# Patient Record
Sex: FEMALE | Race: WHITE | NOT HISPANIC OR LATINO | Employment: FULL TIME | URBAN - METROPOLITAN AREA
[De-identification: names, ages, dates, MRNs, and addresses within clinical notes are randomized per-mention and may not be internally consistent; named-entity substitution may affect disease eponyms.]

---

## 2023-04-21 ENCOUNTER — HOSPITAL ENCOUNTER (EMERGENCY)
Facility: HOSPITAL | Age: 28
Discharge: ANOTHER HOSPITAL | End: 2023-04-21
Attending: EMERGENCY MEDICINE
Payer: MEDICAID

## 2023-04-21 VITALS
WEIGHT: 195.19 LBS | HEIGHT: 66 IN | BODY MASS INDEX: 31.37 KG/M2 | DIASTOLIC BLOOD PRESSURE: 80 MMHG | SYSTOLIC BLOOD PRESSURE: 126 MMHG | OXYGEN SATURATION: 96 % | HEART RATE: 77 BPM | RESPIRATION RATE: 18 BRPM | TEMPERATURE: 98 F

## 2023-04-21 DIAGNOSIS — L03.211 FACIAL CELLULITIS: Primary | ICD-10-CM

## 2023-04-21 DIAGNOSIS — R55 SYNCOPE: ICD-10-CM

## 2023-04-21 LAB
ALBUMIN SERPL BCP-MCNC: 4 G/DL (ref 3.5–5.2)
ALP SERPL-CCNC: 79 U/L (ref 55–135)
ALT SERPL W/O P-5'-P-CCNC: 13 U/L (ref 10–44)
ANION GAP SERPL CALC-SCNC: 15 MMOL/L (ref 8–16)
AST SERPL-CCNC: 22 U/L (ref 10–40)
B-HCG UR QL: NEGATIVE
BASOPHILS # BLD AUTO: 0.06 K/UL (ref 0–0.2)
BASOPHILS NFR BLD: 0.6 % (ref 0–1.9)
BILIRUB SERPL-MCNC: 0.5 MG/DL (ref 0.1–1)
BUN SERPL-MCNC: 11 MG/DL (ref 6–20)
CALCIUM SERPL-MCNC: 9.4 MG/DL (ref 8.7–10.5)
CHLORIDE SERPL-SCNC: 104 MMOL/L (ref 95–110)
CO2 SERPL-SCNC: 18 MMOL/L (ref 23–29)
CREAT SERPL-MCNC: 0.8 MG/DL (ref 0.5–1.4)
DIFFERENTIAL METHOD: ABNORMAL
EOSINOPHIL # BLD AUTO: 0.2 K/UL (ref 0–0.5)
EOSINOPHIL NFR BLD: 1.8 % (ref 0–8)
ERYTHROCYTE [DISTWIDTH] IN BLOOD BY AUTOMATED COUNT: 12.9 % (ref 11.5–14.5)
EST. GFR  (NO RACE VARIABLE): >60 ML/MIN/1.73 M^2
GLUCOSE SERPL-MCNC: 94 MG/DL (ref 70–110)
HCT VFR BLD AUTO: 40.5 % (ref 37–48.5)
HGB BLD-MCNC: 13.4 G/DL (ref 12–16)
IMM GRANULOCYTES # BLD AUTO: 0.04 K/UL (ref 0–0.04)
IMM GRANULOCYTES NFR BLD AUTO: 0.4 % (ref 0–0.5)
LYMPHOCYTES # BLD AUTO: 1.2 K/UL (ref 1–4.8)
LYMPHOCYTES NFR BLD: 12.5 % (ref 18–48)
MCH RBC QN AUTO: 29.5 PG (ref 27–31)
MCHC RBC AUTO-ENTMCNC: 33.1 G/DL (ref 32–36)
MCV RBC AUTO: 89 FL (ref 82–98)
MONOCYTES # BLD AUTO: 0.5 K/UL (ref 0.3–1)
MONOCYTES NFR BLD: 4.8 % (ref 4–15)
NEUTROPHILS # BLD AUTO: 7.9 K/UL (ref 1.8–7.7)
NEUTROPHILS NFR BLD: 79.9 % (ref 38–73)
NRBC BLD-RTO: 0 /100 WBC
PLATELET # BLD AUTO: 316 K/UL (ref 150–450)
PMV BLD AUTO: 10.9 FL (ref 9.2–12.9)
POTASSIUM SERPL-SCNC: 4.3 MMOL/L (ref 3.5–5.1)
PROT SERPL-MCNC: 7.9 G/DL (ref 6–8.4)
RBC # BLD AUTO: 4.54 M/UL (ref 4–5.4)
SODIUM SERPL-SCNC: 137 MMOL/L (ref 136–145)
WBC # BLD AUTO: 9.93 K/UL (ref 3.9–12.7)

## 2023-04-21 PROCEDURE — 90715 TDAP VACCINE 7 YRS/> IM: CPT | Mod: ER | Performed by: EMERGENCY MEDICINE

## 2023-04-21 PROCEDURE — 93010 EKG 12-LEAD: ICD-10-PCS | Mod: ,,, | Performed by: INTERNAL MEDICINE

## 2023-04-21 PROCEDURE — 96375 TX/PRO/DX INJ NEW DRUG ADDON: CPT | Mod: ER

## 2023-04-21 PROCEDURE — 85025 COMPLETE CBC W/AUTO DIFF WBC: CPT | Mod: ER | Performed by: EMERGENCY MEDICINE

## 2023-04-21 PROCEDURE — 96365 THER/PROPH/DIAG IV INF INIT: CPT | Mod: 59,ER

## 2023-04-21 PROCEDURE — 93010 ELECTROCARDIOGRAM REPORT: CPT | Mod: ,,, | Performed by: INTERNAL MEDICINE

## 2023-04-21 PROCEDURE — 93005 ELECTROCARDIOGRAM TRACING: CPT | Mod: ER

## 2023-04-21 PROCEDURE — 90471 IMMUNIZATION ADMIN: CPT | Mod: ER | Performed by: EMERGENCY MEDICINE

## 2023-04-21 PROCEDURE — 25500020 PHARM REV CODE 255: Mod: ER | Performed by: EMERGENCY MEDICINE

## 2023-04-21 PROCEDURE — 99285 EMERGENCY DEPT VISIT HI MDM: CPT | Mod: 25,ER

## 2023-04-21 PROCEDURE — 80053 COMPREHEN METABOLIC PANEL: CPT | Mod: ER | Performed by: EMERGENCY MEDICINE

## 2023-04-21 PROCEDURE — 25000003 PHARM REV CODE 250: Mod: ER | Performed by: EMERGENCY MEDICINE

## 2023-04-21 PROCEDURE — 81025 URINE PREGNANCY TEST: CPT | Mod: ER | Performed by: EMERGENCY MEDICINE

## 2023-04-21 PROCEDURE — 63600175 PHARM REV CODE 636 W HCPCS: Mod: ER | Performed by: EMERGENCY MEDICINE

## 2023-04-21 RX ORDER — KETOROLAC TROMETHAMINE 30 MG/ML
30 INJECTION, SOLUTION INTRAMUSCULAR; INTRAVENOUS EVERY 6 HOURS PRN
Status: DISCONTINUED | OUTPATIENT
Start: 2023-04-21 | End: 2023-04-21 | Stop reason: HOSPADM

## 2023-04-21 RX ORDER — KETOROLAC TROMETHAMINE 30 MG/ML
30 INJECTION, SOLUTION INTRAMUSCULAR; INTRAVENOUS
Status: COMPLETED | OUTPATIENT
Start: 2023-04-21 | End: 2023-04-21

## 2023-04-21 RX ORDER — NAPROXEN 500 MG/1
500 TABLET ORAL 2 TIMES DAILY
COMMUNITY

## 2023-04-21 RX ORDER — SULFAMETHOXAZOLE AND TRIMETHOPRIM 800; 160 MG/1; MG/1
1 TABLET ORAL
COMMUNITY

## 2023-04-21 RX ADMIN — IOHEXOL 75 ML: 350 INJECTION, SOLUTION INTRAVENOUS at 09:04

## 2023-04-21 RX ADMIN — KETOROLAC TROMETHAMINE 30 MG: 30 INJECTION, SOLUTION INTRAMUSCULAR; INTRAVENOUS at 10:04

## 2023-04-21 RX ADMIN — CEFTRIAXONE 1 G: 1 INJECTION, POWDER, FOR SOLUTION INTRAMUSCULAR; INTRAVENOUS at 10:04

## 2023-04-21 RX ADMIN — TETANUS TOXOID, REDUCED DIPHTHERIA TOXOID AND ACELLULAR PERTUSSIS VACCINE, ADSORBED 0.5 ML: 5; 2.5; 8; 8; 2.5 SUSPENSION INTRAMUSCULAR at 08:04

## 2023-04-21 NOTE — ED PROVIDER NOTES
Emergency Medicine Provider Note - 4/21/2023        History     Chief Complaint   Patient presents with    Facial Swelling     Fell into glass table Wednesday, small lac to left cheek. Seen at urgent care, given antibiotics          History of Present Illness   HPI    4/21/2023, 8:10 AM  The history is provided by the patient    Yuval Pltaa is a 28 y.o. female presenting to the ED for evaluation of swelling to the left cheek.  Patient reports that she had blacked out either laid on the 18th or early morning on the 19th.  She reports that she woke up with her glass table broken and bleeding.  Patient was seen at a outlying urgent care on April 19, 2023.  She is prescribed naproxen as well as Bactrim.  Patient reports that she has to wear a mask at work.  She reports that she started having swelling on Wednesday, worse yesterday and more today.  Hurts when she opens her mouth.  Patient does admit to drinking alcohol that night-but she did not get drunk.  Patient denies any neck pain, paresthesias of the hands, chest pain, chest pressure, shortness of breath, fever, difficulty breathing, abdominal pain.  Unknown last tetanus.      Arrival mode:  Personal Vehicle      PCP: Primary Doctor No     Allergies:  Review of patient's allergies indicates:   Allergen Reactions    Clindamycin Hives       Past Medical History:  History reviewed. No pertinent past medical history.    Past Surgical History:  History reviewed. No pertinent surgical history.      Family History:  History reviewed. No pertinent family history.    Social History:  Social History     Tobacco Use    Smoking status: Never    Smokeless tobacco: Never   Substance and Sexual Activity    Alcohol use: Yes    Drug use: Never    Sexual activity: Not on file       Triage note, Allergies, Past Medical History, Past Surgical History, Family History and Social History reviewed as documented above.     Review of Systems   Review of Systems   Constitutional:   Negative for fever.   HENT:  Negative for sore throat.         (+) Facial swelling     Respiratory:  Negative for shortness of breath.    Cardiovascular:  Negative for chest pain.   Gastrointestinal:  Negative for nausea.   Genitourinary:  Negative for dysuria.   Musculoskeletal:  Negative for back pain, neck pain and neck stiffness.   Skin:  Negative for rash.   Neurological:  Negative for weakness and numbness.   Hematological:  Does not bruise/bleed easily.        Physical Exam     Initial Vitals [04/21/23 0805]   BP Pulse Resp Temp SpO2   129/89 89 20 98 °F (36.7 °C) 97 %      MAP       --          Physical Exam    Nursing Notes and Vital Signs Reviewed.  Constitutional: Patient is in. Well-developed and well-nourished.  Head:  Normocephalic.  There is swelling noted to the left side of the face.  There is a eschar present.  No warmth or erythema noted.  Eyes: PERRL. EOM intact. Conjunctivae are not pale. No scleral icterus.  ENT: Mucous membranes are moist. Oropharynx is clear and symmetric.  No hemotympanum.  Patient is able to handle secretions.  There is trismus present.  No deformity to the inside of the mouth.  No sub lingual hematoma.  Neck: Supple. Full ROM. No lymphadenopathy.  No midline cervical neck tenderness.  Patient able to rotate neck 45° without pain.  Cardiovascular: Regular rate. Regular rhythm. No murmurs, rubs, or gallops. Distal pulses are 2+ and symmetric.  Pulmonary/Chest: No respiratory distress. Clear to auscultation bilaterally. No wheezing or rales.  Abdominal: Soft and non-distended.  There is no tenderness.  No rebound, guarding, or rigidity. Good bowel sounds.  Musculoskeletal: Moves all extremities. No obvious deformities. No edema. No calf tenderness.  Skin: Warm and dry.  Neurological:  Alert, awake, and appropriate.  Normal speech.  No acute focal neurological deficits are appreciated.  GCS 15.  Psychiatric: Normal affect. Good eye contact. Appropriate in content.           ED  "Course     ED Procedures:  Procedures    ED Vital Signs:  Vitals:    04/21/23 0805 04/21/23 1032 04/21/23 1238 04/21/23 1356   BP: 129/89 120/77 112/70    Pulse: 89 78 88    Resp: 20  19    Temp: 98 °F (36.7 °C)      TempSrc: Oral      SpO2: 97% 96% 99%    Weight: 88.6 kg (195 lb 3.5 oz)      Height:    5' 6" (1.676 m)       Abnormal Lab Results:  Labs Reviewed   CBC W/ AUTO DIFFERENTIAL - Abnormal; Notable for the following components:       Result Value    Gran # (ANC) 7.9 (*)     Gran % 79.9 (*)     Lymph % 12.5 (*)     All other components within normal limits   COMPREHENSIVE METABOLIC PANEL - Abnormal; Notable for the following components:    CO2 18 (*)     All other components within normal limits   PREGNANCY TEST, URINE RAPID    Narrative:     Specimen Source->Urine        All Lab Results:  Results for orders placed or performed during the hospital encounter of 04/21/23   Rapid Pregnancy, Urine   Result Value Ref Range    Preg Test, Ur Negative    CBC Auto Differential   Result Value Ref Range    WBC 9.93 3.90 - 12.70 K/uL    RBC 4.54 4.00 - 5.40 M/uL    Hemoglobin 13.4 12.0 - 16.0 g/dL    Hematocrit 40.5 37.0 - 48.5 %    MCV 89 82 - 98 fL    MCH 29.5 27.0 - 31.0 pg    MCHC 33.1 32.0 - 36.0 g/dL    RDW 12.9 11.5 - 14.5 %    Platelets 316 150 - 450 K/uL    MPV 10.9 9.2 - 12.9 fL    Immature Granulocytes 0.4 0.0 - 0.5 %    Gran # (ANC) 7.9 (H) 1.8 - 7.7 K/uL    Immature Grans (Abs) 0.04 0.00 - 0.04 K/uL    Lymph # 1.2 1.0 - 4.8 K/uL    Mono # 0.5 0.3 - 1.0 K/uL    Eos # 0.2 0.0 - 0.5 K/uL    Baso # 0.06 0.00 - 0.20 K/uL    nRBC 0 0 /100 WBC    Gran % 79.9 (H) 38.0 - 73.0 %    Lymph % 12.5 (L) 18.0 - 48.0 %    Mono % 4.8 4.0 - 15.0 %    Eosinophil % 1.8 0.0 - 8.0 %    Basophil % 0.6 0.0 - 1.9 %    Differential Method Automated    Comprehensive Metabolic Panel   Result Value Ref Range    Sodium 137 136 - 145 mmol/L    Potassium 4.3 3.5 - 5.1 mmol/L    Chloride 104 95 - 110 mmol/L    CO2 18 (L) 23 - 29 mmol/L    " Glucose 94 70 - 110 mg/dL    BUN 11 6 - 20 mg/dL    Creatinine 0.8 0.5 - 1.4 mg/dL    Calcium 9.4 8.7 - 10.5 mg/dL    Total Protein 7.9 6.0 - 8.4 g/dL    Albumin 4.0 3.5 - 5.2 g/dL    Total Bilirubin 0.5 0.1 - 1.0 mg/dL    Alkaline Phosphatase 79 55 - 135 U/L    AST 22 10 - 40 U/L    ALT 13 10 - 44 U/L    Anion Gap 15 8 - 16 mmol/L    eGFR >60.0 >60 mL/min/1.73 m^2         ECG Results              EKG 12-lead (Preliminary result)  Result time 04/21/23 08:54:26      Wet Read by Missy Xiong DO (04/21/23 08:54:26, [facility], Emergency Medicine)    Rate of 80 beats per minute.  Sinus rhythm.  Sinus arrhythmia.  Normal axis.  No ST segment elevation.  No STEMI.                                    Imaging Results:  Imaging Results              CT Maxillofacial With Contrast (Final result)  Result time 04/21/23 09:51:07      Final result by ANNELISE De Paz Sr., MD (04/21/23 09:51:07)                   Impression:      1. There is a mild amount of haziness on the left side of the face.  This is consistent with the patient's history and characteristic of a cellulitis.  There is no abscess or abnormal drainable fluid collection visualized.  2. There are prominent size lymph nodes on both sides of the neck.  One of the larger ones is located in the left anterior lymph node chain at the level of C3.  It has a short axis measurement of 10 mm.  3. The paranasal sinuses are clear.  All CT scans at this facility use dose modulation, iterative reconstruction, and/or weight base dosing when appropriate to reduce radiation dose when appropriate to reduce radiation dose to as low as reasonably achievable.      Electronically signed by: Timothy De Paz MD  Date:    04/21/2023  Time:    09:51               Narrative:    EXAMINATION:  CT MAXILLOFACIAL WITH CONTRAST    CLINICAL HISTORY:  Maxillary/facial abscess;    TECHNIQUE:  Standard facial CT protocol with IV contrast material was performed.  75 mL of Omnipaque  350 contrast material was used for this examination.    COMPARISON:  None    FINDINGS:  There is a mild amount of haziness on the left side of the face.  There is no abscess or abnormal drainable fluid collection visualized.  There are prominent size lymph nodes on both sides of the neck.  One of the larger ones is located in the left anterior lymph node chain at the level of C3.  It has a short axis measurement of 10 mm.  The ostiomeatal complexes are patent bilaterally. The paranasal sinuses are clear. There is no fracture. There is no dislocation.                                            The Emergency Provider reviewed the vital signs and test results, which are outlined above.     ED Discussion     ED Course as of 04/21/23 1435   Fri Apr 21, 2023   0853 WBC: 9.93 [LB]   0853 Hemoglobin: 13.4 [LB]   0853 Hematocrit: 40.5 [LB]   1017 Patient had been on Bactrim for 48 hours.  Patient reports increasing swelling.  Concern for outpatient cellulitis.  Discussed with patient that there was no identified bowel drainable abscess.  Patient does have trismus.  Recommend hospitalization.  Patient is requesting transfer to [facility] or [facility] provided they have oral maxillofacial services.  Of note [facility] does not have oral maxillofacial services, ENT Services, or dental services today.  As patient is experiencing trismus, I do recommend facility with oral maxillofacial in case patient's situation progressive.  Patient is aware that we recommend transfer via ambulance.  She is requesting to go via private vehicle. [LB]   1039 [facility] does not have oral maxillofacial.  You [facility] available as well as possible [place].  This was discussed with patient.  Patient is deciding whether to proceed with transfer or sign out against medical advice [LB]   1337 Patient has been updated on no local facilities with oral maxillofacial.  Although patient  does not have anything that specifically needs to drain, there is no local facility.  Patient is on waiting list at [facility].  Patient has been offered food.  Awaiting acceptance. [LB]   1340 Of note, patient was given IV ketorolac.  Patient is aware that we transfer via ambulance.  However she is requesting to go by private vehicle.  Therefore narcotic medication not administered. [LB]   1349 [facility] still on divert. [LB]   1414 Spoke with Dr. Elsa Arrillaga at [facility] ED. [LB]   1420 Updated patient on [LB]   1424 Discussed with patient risk of private vehicle transfer include MVC, death, inclement weather, loss of airway, swelling. [LB]      ED Course User Index  [LB] Missy Xiong DO     2:25 PM   All historical, clinical, radiographic, and laboratory findings were reviewed with the patient/family in detail.  I discussed the indications and treatment need: ( Oral/maxofacial ) .    Patient/Family requests transfer to:  [facility]    Patient/Family understands that [facility] does NOT provide ( Oral/maxofacial ) services.     Patient/family verbalized understanding.   All remaining questions and concerns were addressed at that time and the patient/family agrees to proceed accordingly.  Similarly all pertinent details of the encounter were discussed with  Jackie Adamson  at  [facility]  .  Dr. Elsa Arrillaga at [facility]  agrees to accept the patient in transfer based on the needs/patient preferences outlined above.  Patient will [facility],Patient refused transfer via ambulance.loss of airway.  Risks: of transfer:    loss of vitals signs, permanent neurologic damage, MVC, inclement weather resulting in death, , or loss of neurologic function.  Benefits of transfer:  Oral/maxofacial .  Patient and family agree and verbalize understanding.     Missy Xiong DO,  FACEP        ED Medication(s):  Medications   ketorolac injection 30 mg (has no administration in time range)  "  Tdap (BOOSTRIX) vaccine injection 0.5 mL (0.5 mLs Intramuscular Given 4/21/23 0841)   iohexoL (OMNIPAQUE 350) injection 75 mL (75 mLs Intravenous Given 4/21/23 0935)   ketorolac injection 30 mg (30 mg Intravenous Given 4/21/23 1030)   cefTRIAXone (ROCEPHIN) 1 g in dextrose 5 % in water (D5W) 5 % 50 mL IVPB (MB+) (0 g Intravenous Stopped 4/21/23 1107)         Medical Decision Making   Medical Decision Making  Patient recent fall.  Sustained laceration on left cheek.  Placed on antibiotics as outpatient.  Patient continued to have worsening swelling.    Differential diagnosis includes:  Cellulitis, abscess, hematoma, fracture, retained foreign body.    Patient given IV Rocephin.  Patient has left shift, normal white cell count.  Patient failed outpatient treatment cellulitis.  Recommend transfer.    Amount and/or Complexity of Data Reviewed  Labs: ordered.  Radiology: ordered.     Details: Radiology report reviewed.    Risk  Decision regarding hospitalization.          Discussed case with: Dr. Elsa Arrillaga            MIPS Measures     Smoker? No     Hypertension: None    Portions of this note may have been created with voice recognition software. Occasional "wrong-word" or "sound-a-like" substitutions may have occurred due to the inherent limitations of voice recognition software. Please, read the note carefully and recognize, using context, where substitutions have occurred.            Clinical Impression       ICD-10-CM ICD-9-CM   1. Facial cellulitis, left  L03.211 682.0   2. Syncope  R55 780.2         ED Disposition       Disposition: Transfer to [facility] to / Elsa Arrillaga  Patient condition: Stable                   Missy Xiong, DO  04/21/23 1435    "